# Patient Record
Sex: MALE | Race: WHITE | Employment: UNEMPLOYED | URBAN - METROPOLITAN AREA
[De-identification: names, ages, dates, MRNs, and addresses within clinical notes are randomized per-mention and may not be internally consistent; named-entity substitution may affect disease eponyms.]

---

## 2017-01-26 ENCOUNTER — TELEPHONE (OUTPATIENT)
Dept: FAMILY MEDICINE CLINIC | Age: 57
End: 2017-01-26

## 2017-01-26 NOTE — TELEPHONE ENCOUNTER
Patient is needing the left eye cataract removed at this time. [facility] runs out in March. Please advise.

## 2017-01-27 NOTE — TELEPHONE ENCOUNTER
Attempted to return the phone call, but no one answered and the message stated that the voicemail box has not been set up yet. Per chart review, the patient was last seen by a [facility] provider in March, 2016. He will need to make the line and see a provider again for a new referral to AN.  Karrie Wesley RN

## 2017-02-02 NOTE — TELEPHONE ENCOUNTER
Called returned to [place], m[place] on HIPA form. General message left to return call to [facility] office nurse.

## 2017-02-07 NOTE — TELEPHONE ENCOUNTER
Called the pt again to let his spouse know that RN had called one of the AN coordinators and asked her about the pt keeping the same Dr through AN. Told pt's spouse AN coordinator stated whichever clinic the application is done at,  They could see which Dr the pt had been seeing and try to have the pt continue seeing that Dr. The only reason he could not see the same provider would be if the provider no longer was participating with AN. The pt's spouse was very appreciative of this information. The spouse stated the only reason the pt started going to the [facility] was because he no longer could afford his medications.   Sandy Palomino RN

## 2017-02-07 NOTE — TELEPHONE ENCOUNTER
Returned call to [place] spouse on the HIPA form. She stated the pt had gone to the [facility]. she said \"we did not know the [facility] was hooked up with the [facility]\". She stated \"when I told the staff at the [facility] that we were going to come to the [facility] tomorrow, the [facility] told us that we could not come to the [facility]. This RN explained to the pt that the [facility] and [facility] clinics were not hooked up together, but [facility] and [facility] do the same things. Both are clinics for people without insurance. [facility] was not part of [facility] or [facility]. The spouse stated they had gotten the pt's AN application started at [facility], but she wanted to bring the pt to the [facility] tomorrow and get AN through the [facility] so the pt can go see Dr Mylene Adame again. AN process was explained to the pt's spouse. Told spouse, the pt can go to [facility] or [facility], but it can not be both. It;s one or the other. If the application process was already started through [facility] then they could not come and do another one through the [facility]. This RN told the spouse it was not known weather the pt would get the same Dr or a different Dr through AN. The spouse asked if she should call Dr Mylene Adame office and ask if the pt could see Dr Mylene Adame. Spouse was told she could and they would make an appt for her but would charge her where AN is free. Encouraged pt spouse to call [facility] and ask them about seeing Dr Mylene Adame. Not sure when the AN expires,( per pt's spouse from previous call made to [facility]. She said it expires in March). Rhonda Valdivia RN was called and discussed this with her. The pt had signed a contract with the [facility] to make the [facility] the pt's., but after th ereferral was given to the pt he continued to see another PCP at [address] [place] [address]. Will leave the AN referral with [facility] at this time unless the pt does come back to the [facility].  If the pt does come to the [facility] tomorrow or in the future for AN referral it will need to be explained to him regarding the contract he signed stating he is now choosing the [facility] as his PCP.   Ulysses Mesa, RN

## 2017-03-17 ENCOUNTER — TELEPHONE (OUTPATIENT)
Dept: FAMILY MEDICINE CLINIC | Age: 57
End: 2017-03-17

## 2017-03-17 NOTE — TELEPHONE ENCOUNTER
Pls call wife in ref to:  back xrays report     She doesn't understand some of the info on the letter/report     Best number to reach her is [phone]

## 2017-03-21 NOTE — TELEPHONE ENCOUNTER
Spoke to Ms. Dubois Men and pt was in the back ground. Explained DJD X-ray of his back. He have established care with the [facility]. They are giving him medications. He's scheduled to see psych.       Alba Butts MD  3/21/2017

## 2017-03-30 ENCOUNTER — TELEPHONE (OUTPATIENT)
Dept: FAMILY MEDICINE CLINIC | Age: 57
End: 2017-03-30

## 2017-03-30 NOTE — TELEPHONE ENCOUNTER
Received call from patient spouse  Starr Bueno   She stated that the patient has some concerns with his left eye and wanted to know if the patient could be seen at the [place] [address] since patient no longer has the [facility] care card. I supplied Mrs Dann Watson with the location of the clinic sites since the patient hasn't been seen at the [place] [address] for a year However, I informed Mrs Dann Watson that I will  Leave message with call back nurse to call her back.       Call routed to Iwona Goyal NP  And  Call back nurse     [facility]

## 2017-03-31 NOTE — TELEPHONE ENCOUNTER
Call returned to spouse on HIPA form. \"I have set up an appt just this am.\" [facility] is not needed at this time. per Dav Godoy. Call resolved and closed.

## 2017-03-31 NOTE — TELEPHONE ENCOUNTER
He needs to go through his Primary Care Provider, Dr. Mikki Leone, to see a specialist.    He does not need to see the [place] [facility] to apply for the Care Card.